# Patient Record
Sex: MALE | Race: AMERICAN INDIAN OR ALASKA NATIVE | NOT HISPANIC OR LATINO | Employment: FULL TIME | URBAN - NONMETROPOLITAN AREA
[De-identification: names, ages, dates, MRNs, and addresses within clinical notes are randomized per-mention and may not be internally consistent; named-entity substitution may affect disease eponyms.]

---

## 2022-04-19 ENCOUNTER — OFFICE VISIT (OUTPATIENT)
Dept: OTOLARYNGOLOGY | Facility: OTHER | Age: 42
End: 2022-04-19
Attending: OTOLARYNGOLOGY

## 2022-04-19 DIAGNOSIS — L90.5 SCAR OF LIP: Primary | ICD-10-CM

## 2022-04-19 PROCEDURE — G0463 HOSPITAL OUTPT CLINIC VISIT: HCPCS

## 2022-04-26 NOTE — PROGRESS NOTES
document embedded image  Patient Name: Laurent Payan    Address: [address] [place] [address]     YOB: 1980    [place] [address]    MR Number: [id number]    Phone: [phone]  PCP: Renee Mccabe PA-C            Appointment Date: 04/19/22   Visit Provider: Eyal Yeager MD    cc: Renee Mccabe PA-C; ~    ENT Progress Note  Intake  Visit Reasons: Lip abscess    HPI  History of Present Illness  Chief complaint:  History of upper lip abscess    History  The patient is a 41-year-old man who developed an abscess in his upper lip about 4 months ago.  This was appropriately incised and drained.  He has no lingering pain but notices a lot of firmness in the soft tissues of his upper lip.     Exam  Oral cavity oropharynx-there is a scar on the inner aspect of his upper lip to the right of midline.  With palpation there is firmness surrounding the scar an old abscess cavity.  Neck-no masses or adenopathy  Nasal-no obstruction or purulence  Head neck integument-otherwise clear  General-the patient appears well and in no distress  Neuro-there are no focal cranial nerve deficits    A&P  Assessment & Plan  (1) Scar of lip:        Status: Acute        Code(s):  L90.5 - Scar conditions and fibrosis of skin  The patient was counseled that the firm tissues in his upper lip are scar from his old abscess.  He was encouraged that the scar will mature in soften over the next year but he should expect some palpable deformity in his lip for the rest of his life.      Eyal Yeager MD    04/18/22 1300    <Electronically signed by Eyal Yeager MD> 04/21/22 [id number]